# Patient Record
(demographics unavailable — no encounter records)

---

## 2025-01-08 NOTE — DISCUSSION/SUMMARY
[EKG obtained to assist in diagnosis and management of assessed problem(s)] : EKG obtained to assist in diagnosis and management of assessed problem(s) [FreeTextEntry1] : 47 year old woman with history of anxiety, symptomatic APCs/PVCs, presenting for followup of paroxysmal atrial fibrillation.   Since her first episode of AF last July, she has experienced two additional episodes, one in October and another in December, with the latter requiring hospitalization. She has had rapid rates and significant symptoms during AF, and she is now interested in proceeding with rhythm control.   We again discussed atrial fibrillation in detail, associated risks and morbidities, and management options including continued rate control, antiarrhythmic medications, and catheter ablation. She has expressed desire to avoid long term medication and is now ready to proceed with AF ablation.  We discussed the risks and benefits of AF ablation in detail, including procedure related risks such as bleeding, vascular injury, cardiac perforation, stroke and esophageal injury. She expressed understanding and does want to proceed. She will also need to be on anticoagulation for 1 month leading up to the procedure and continue for at least 2 months following, despite CHADSVASc=0.  We also reviewed importance of lifestyle and risk factor modifications, including stress reduction. She has been noted to have mildly low potassium during presentation to ED, and may benefit from supplementation if this is consistent. I did recommend regular magnesium supplement.  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   She also has a history of brief palpitation and previously was previously noted to have occasional APCs and PVCs, and had been on metoprolol. Recently, she has experienced a significant increase in the frequency of these episodes, noting that they occur every few minutes. Her PVC burden has remained low (<1% on recent monitor) however these episodes have alos been bothersome and trigger significant anxiety. Though AF ablation may reduce the APCs burden, its unlikely to impact her PVCs. This can be revisited in future as needed.   Recommendations  -AF ablation -continue Toprol 50mg qd, or prn as symptoms arise. Hold Toprol 3 days prior to ablation -Initiate Eliquis 5mg BID initiate at least 1 month prior to procedure and continue 2 months after. - sleep medicine followup -f/u electrolytes -continue monitoring with Apple Watch. -EP followup after above or prn

## 2025-01-08 NOTE — CARDIOLOGY SUMMARY
[de-identified] : 1/8/25: SR 72 7/17/24 sinus rhythm 68 bpm, rsr', narrow QRS [de-identified] : 7/14/24 LVEF 55-60%, trace MR, mild TR TTE 9/23/22 LVEF 63%, LA 3cm, trace MR, mild TR, PASP 30

## 2025-01-08 NOTE — PHYSICAL EXAM
[Well Developed] : well developed [No Acute Distress] : no acute distress [Normal Conjunctiva] : normal conjunctiva [Normal Venous Pressure] : normal venous pressure [Normal S1, S2] : normal S1, S2 [Good Air Entry] : good air entry [No Respiratory Distress] : no respiratory distress  [Normal Gait] : normal gait [No Edema] : no edema [No Cyanosis] : no cyanosis [No Clubbing] : no clubbing [Moves all extremities] : moves all extremities [No Focal Deficits] : no focal deficits [Normal Speech] : normal speech [Alert and Oriented] : alert and oriented

## 2025-01-08 NOTE — CARDIOLOGY SUMMARY
[de-identified] : 1/8/25: SR 72 7/17/24 sinus rhythm 68 bpm, rsr', narrow QRS [de-identified] : 7/14/24 LVEF 55-60%, trace MR, mild TR TTE 9/23/22 LVEF 63%, LA 3cm, trace MR, mild TR, PASP 30

## 2025-01-08 NOTE — HISTORY OF PRESENT ILLNESS
[FreeTextEntry1] : 47 year old woman with history of anxiety, symptomatic APCs/PVCs, presenting for followup of paroxysmal atrial fibrillation.   She has a history of brief palpitation and was previously noted to have occasional APCs and PVCs, and had been on metoprolol. On 24 she presented to [facility] with sudden and sustained palpitation, and dyspnea. This began prior going to sleep after a long day when she had a block party (and had 2 beers). She was found to be in AF with rapid rates up to 190 bpm. Labs notable for hypokalemia. She was treated with rate control, and ultimately the AF terminated after several hours. TTE was normal. She was discharged with metoprolol 50mg bid (increased from 50mg once daily) and ASA 81mg qd.   She had another brief episode of AF 10/24. She notes that that day she was very active cleaning her garage.  24 she had another episode of AF following her mother's . She presented to [facility] with sudden and sustained palpitation, and dyspnea, and had rates >200 bpm. She was treated with Cardizem and Metoprolol, and ultimately the AF terminated after several hours. She notes that she had missed her Toprol dose for two days prior to this episode.  A prior event monitor -/24 revealed sinus rhythm (avg 88, range  bpm) brief runs of pAT and rare APCs.  Event monitor worn -24 revealed sinus rhythm (avg 91, range ), <1% PVCs  She is now using an Apple Watch with AF notifications, and this did appropriately notify her of AF during recent episodes.  She does also feel palpitation related to PVCs, which she has confirmed on Apple Watch. These are mildly bothersome and can occur periodically throughout the day.   She has been diagnosed with mild ART, but had not started treatment for this.  She denies significant EtOH intake.  She has significant anxiety since traumatic experience when her fiance  years ago. She believes her palpitation exacerbates her anxiety.   ECG today reveals SR 72 bpm  Current medications include Toprol 50mg BID

## 2025-01-08 NOTE — END OF VISIT
[Time Spent: ___ minutes] : I have spent [unfilled] minutes of time on the encounter which excludes teaching and separately reported services. [FreeTextEntry4] :  I, Jose Malave, am scribing for and in the presence of  in the following sections HISTORY OF PRESENT ILLNESSS, PAST MEDICAL/FAMILY/SOCIAL HISTORY; REVIEW OF SYSTEMS; VITAL SIGNS; PHYSICAL EXAM; ASSESSMENT/PLAN [FreeTextEntry3] : I, Dimitrios Ellington, personally performed the services described in this documentation. All medical record entries made by the scribe were at my direction and in my presence. I have reviewed the chart and agree that the record reflects my personal performance and is accurate and complete.            I was present for the above evaluation and agree with the history, physical examination, assessment and plan as above.

## 2025-01-08 NOTE — HISTORY OF PRESENT ILLNESS
[FreeTextEntry1] : 47 year old woman with history of anxiety, symptomatic APCs/PVCs, presenting for followup of paroxysmal atrial fibrillation.   She has a history of brief palpitation and was previously noted to have occasional APCs and PVCs, and had been on metoprolol. On 24 she presented to [facility] with sudden and sustained palpitation, and dyspnea. This began prior going to sleep after a long day when she had a block party (and had 2 beers). She was found to be in AF with rapid rates up to 190 bpm. Labs notable for hypokalemia. She was treated with rate control, and ultimately the AF terminated after several hours. TTE was normal. She was discharged with metoprolol 50mg bid (increased from 50mg once daily) and ASA 81mg qd.   She had another brief episode of AF 10/24. She notes that that day she was very active cleaning her garage.  24 she had another episode of AF following her mother's . She presented to [facility] with sudden and sustained palpitation, and dyspnea, and had rates >200 bpm. She was treated with Cardizem and Metoprolol, and ultimately the AF terminated after several hours. She notes that she had missed her Toprol dose for two days prior to this episode.  A prior event monitor -/24 revealed sinus rhythm (avg 88, range  bpm) brief runs of pAT and rare APCs.  Event monitor worn -24 revealed sinus rhythm (avg 91, range ), <1% PVCs  She is now using an Apple Watch with AF notifications, and this did appropriately notify her of AF during recent episodes.  She does also feel palpitation related to PVCs, which she has confirmed on Apple Watch. These are mildly bothersome and can occur periodically throughout the day.   She has been diagnosed with mild ART, but had not started treatment for this.  She denies significant EtOH intake.  She has significant anxiety since traumatic experience when her fiance  years ago. She believes her palpitation exacerbates her anxiety.   ECG today reveals SR 72 bpm  Current medications include Toprol 50mg BID

## 2025-01-08 NOTE — REVIEW OF SYSTEMS
[Palpitations] : palpitations [Anxiety] : anxiety [Negative] : Heme/Lymph [SOB] : no shortness of breath [Dyspnea on exertion] : not dyspnea during exertion [Chest Discomfort] : no chest discomfort [Lower Ext Edema] : no extremity edema [Leg Claudication] : no intermittent leg claudication [Syncope] : no syncope [Dizziness] : no dizziness [Convulsions] : no convulsions [Confusion] : no confusion was observed

## 2025-01-08 NOTE — REASON FOR VISIT
[Arrhythmia/ECG Abnorrmalities] : arrhythmia/ECG abnormalities [FreeTextEntry1] : New Visit [FreeTextEntry3] : Dr Lazaro

## 2025-05-07 NOTE — END OF VISIT
[FreeTextEntry3] :   I, Dr. Ellington, personally performed the evaluation and management (E/M) services for this established patient who presents today with for evaluation of AF and APCs post ablation.  That E/M includes conducting the examination, assessing all new/exacerbated conditions, and establishing a new plan of care.  Today, my ACP, Ingrid Mccormack, was here to observe my evaluation and management services for this new problem/exacerbated condition to be followed going forward.   [Time Spent: ___ minutes] : I have spent [unfilled] minutes of time on the encounter which excludes teaching and separately reported services.

## 2025-05-07 NOTE — DISCUSSION/SUMMARY
[EKG obtained to assist in diagnosis and management of assessed problem(s)] : EKG obtained to assist in diagnosis and management of assessed problem(s) [FreeTextEntry1] : 47 year old woman with history of anxiety, symptomatic APCs/PVCs, presenting for followup of paroxysmal atrial fibrillation s/p AF ablation 4/7/25.   Patient reported to the office today s/p AF ablation on 4/725. Since the procedure she reports feeling very well and states she has had a significant improvement in feeling palpitation and her PAC's that were highly bothersome in the past. She has continued to wear her apple watch daily without any recent AF alerts. She has been maintained on Eliquis 5 mg BID and tolerating, as well as Toprol 50mg BID. She has a low CHADSVASc score of 1 (gender only)  and would be agreeable to continue OAC for 3 months post ablatin and wear a repeat monitor to assess for AF burden post ablation. If no AF is seen, would be reasonable to d/c OAC with Eliquis and continue to monitor heart rhythm via smart watch/symptoms.  She has been on high dose metoprolol (50mg bid) given hx of AF and symptomatic ectopy, but has had some fatigue with this. Will lower Toprol to 50mg qd for now.   Recommendations:  -Reduce Toprol 50mg BID to QD.  -Continue Eliquis 5mg BID for 3 months post ablation, then D/c if no recurrent AF -Continue monitoring with Apple Watch for AF alerts  -Repeat 1-week Holter monitor 3 months post ablation for AF surveillance -EP f/u in 6 months or sooner PRN

## 2025-05-07 NOTE — HISTORY OF PRESENT ILLNESS
[FreeTextEntry1] : 47 year old woman with history of anxiety, symptomatic APCs/PVCs, presenting for followup of paroxysmal atrial fibrillation s/p AF ablation 4/7/25.   She has a history of brief palpitation and was previously noted to have occasional APCs and PVCs, and had been on metoprolol. On 7/14/24 she presented to [facility] with sudden and sustained palpitation, and dyspnea. This began prior going to sleep after a long day when she had a block party (and had 2 beers). She was found to be in AF with rapid rates up to 190 bpm. Labs notable for hypokalemia. She was treated with rate control, and ultimately the AF terminated after several hours. TTE was normal. She was discharged with metoprolol 50mg bid (increased from 50mg once daily) and ASA 81mg qd.  She had additional episodes of AF with RVR prompting hospitalizations.   She is now using an Apple Watch with AF notifications, and this did appropriately notify her of AF during recent episodes. She has been diagnosed with mild ART, but had not started treatment for this.   On 4/8/25 she underwent AF ablation (PFA FARAPULSE with PVI,PW isolation)  She has been doing well since her AF ablation. She denies palpitation, dizziness, SOB, syncope or near syncope. She noticed a high heart rate on her apple watch 3 days post procedure ~ 160bpm, which came back down to normal. She has been maintained on Eliquis 5mg BID which she has been tolerating without bleeding events or falls, and Metoprolol 50 mg BID.  ECG today reveals SR 67 bpm Current medications include Toprol 50mg BID, Eliquis 5 mg BID

## 2025-05-07 NOTE — REVIEW OF SYSTEMS
[Anxiety] : anxiety [Negative] : Heme/Lymph [Confusion] : no confusion was observed [Feeling Fatigued] : not feeling fatigued [SOB] : no shortness of breath [Dyspnea on exertion] : not dyspnea during exertion [Chest Discomfort] : no chest discomfort [Lower Ext Edema] : no extremity edema [Leg Claudication] : no intermittent leg claudication [Palpitations] : no palpitations [Orthopnea] : no orthopnea [PND] : no PND [Syncope] : no syncope [Dizziness] : no dizziness [Convulsions] : no convulsions [Under Stress] : not under stress [Easy Bleeding] : no tendency for easy bleeding [FreeTextEntry5] : see HPI

## 2025-05-07 NOTE — PHYSICAL EXAM
[Well Developed] : well developed [No Acute Distress] : no acute distress [Normal Conjunctiva] : normal conjunctiva [Normal Venous Pressure] : normal venous pressure [Normal S1, S2] : normal S1, S2 [Good Air Entry] : good air entry [No Respiratory Distress] : no respiratory distress  [Normal Gait] : normal gait [No Edema] : no edema [No Cyanosis] : no cyanosis [No Clubbing] : no clubbing [Moves all extremities] : moves all extremities [No Focal Deficits] : no focal deficits [Normal Speech] : normal speech [Alert and Oriented] : alert and oriented [No Rash] : no rash [de-identified] : B/L groin sites assessed- healed without s/s of infection, hematoma or complications

## 2025-05-07 NOTE — CARDIOLOGY SUMMARY
[de-identified] : 5/7/25: SR HR 67 bpm  1/8/25: SR 72 7/17/24 sinus rhythm 68 bpm, rsr', narrow QRS [de-identified] : 7/14/24 LVEF 55-60%, trace MR, mild TR TTE 9/23/22 LVEF 63%, LA 3cm, trace MR, mild TR, PASP 30